# Patient Record
Sex: FEMALE | Race: WHITE | NOT HISPANIC OR LATINO | Employment: OTHER | URBAN - METROPOLITAN AREA
[De-identification: names, ages, dates, MRNs, and addresses within clinical notes are randomized per-mention and may not be internally consistent; named-entity substitution may affect disease eponyms.]

---

## 2024-03-16 ENCOUNTER — APPOINTMENT (OUTPATIENT)
Dept: CT IMAGING | Facility: CLINIC | Age: 74
End: 2024-03-16
Attending: PHYSICIAN ASSISTANT
Payer: COMMERCIAL

## 2024-03-16 ENCOUNTER — APPOINTMENT (OUTPATIENT)
Dept: RADIOLOGY | Facility: CLINIC | Age: 74
End: 2024-03-16
Attending: PHYSICIAN ASSISTANT
Payer: COMMERCIAL

## 2024-03-16 ENCOUNTER — HOSPITAL ENCOUNTER (EMERGENCY)
Facility: CLINIC | Age: 74
Discharge: HOME | End: 2024-03-16
Admitting: PHYSICIAN ASSISTANT
Payer: COMMERCIAL

## 2024-03-16 VITALS
DIASTOLIC BLOOD PRESSURE: 70 MMHG | TEMPERATURE: 98.9 F | OXYGEN SATURATION: 99 % | BODY MASS INDEX: 26.21 KG/M2 | SYSTOLIC BLOOD PRESSURE: 156 MMHG | HEIGHT: 59 IN | HEART RATE: 89 BPM | RESPIRATION RATE: 16 BRPM | WEIGHT: 130 LBS

## 2024-03-16 DIAGNOSIS — S01.01XA SCALP LACERATION, INITIAL ENCOUNTER: ICD-10-CM

## 2024-03-16 DIAGNOSIS — D69.6 THROMBOCYTOPENIA (H): ICD-10-CM
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
## 2024-03-16 DIAGNOSIS — M25.511 RIGHT SHOULDER PAIN: ICD-10-CM

## 2024-03-16 DIAGNOSIS — S09.90XA INJURY OF HEAD, INITIAL ENCOUNTER: ICD-10-CM

## 2024-03-16 LAB
ANION GAP SERPL CALCULATED.3IONS-SCNC: 9 MMOL/L (ref 7–15)
BUN SERPL-MCNC: 8.5 MG/DL (ref 8–23)
CALCIUM SERPL-MCNC: 8.8 MG/DL (ref 8.8–10.2)
CHLORIDE SERPL-SCNC: 107 MMOL/L (ref 98–107)
CREAT SERPL-MCNC: 0.51 MG/DL (ref 0.51–0.95)
DEPRECATED HCO3 PLAS-SCNC: 23 MMOL/L (ref 22–29)
EGFRCR SERPLBLD CKD-EPI 2021: >90 ML/MIN/1.73M2
ERYTHROCYTE [DISTWIDTH] IN BLOOD BY AUTOMATED COUNT: 15.2 % (ref 10–15)
GLUCOSE SERPL-MCNC: 97 MG/DL (ref 70–99)
HCT VFR BLD AUTO: 34.6 % (ref 35–47)
HGB BLD-MCNC: 10.7 G/DL (ref 11.7–15.7)
MCH RBC QN AUTO: 25.2 PG (ref 26.5–33)
MCHC RBC AUTO-ENTMCNC: 30.9 G/DL (ref 31.5–36.5)
MCV RBC AUTO: 81 FL (ref 78–100)
PLATELET # BLD AUTO: 111 10E3/UL (ref 150–450)
POTASSIUM SERPL-SCNC: 4.1 MMOL/L (ref 3.4–5.3)
RBC # BLD AUTO: 4.25 10E6/UL (ref 3.8–5.2)
SODIUM SERPL-SCNC: 139 MMOL/L (ref 135–145)
WBC # BLD AUTO: 6.2 10E3/UL (ref 4–11)

## 2024-03-16 PROCEDURE — 80048 BASIC METABOLIC PNL TOTAL CA: CPT | Performed by: PHYSICIAN ASSISTANT

## 2024-03-16 PROCEDURE — 85027 COMPLETE CBC AUTOMATED: CPT | Performed by: PHYSICIAN ASSISTANT

## 2024-03-16 PROCEDURE — 250N000009 HC RX 250: Performed by: PHYSICIAN ASSISTANT

## 2024-03-16 PROCEDURE — 99284 EMERGENCY DEPT VISIT MOD MDM: CPT | Mod: 25

## 2024-03-16 PROCEDURE — 70450 CT HEAD/BRAIN W/O DYE: CPT

## 2024-03-16 PROCEDURE — 73030 X-RAY EXAM OF SHOULDER: CPT | Mod: RT

## 2024-03-16 PROCEDURE — 271N000002 HC RX 271: Performed by: PHYSICIAN ASSISTANT

## 2024-03-16 PROCEDURE — 250N000013 HC RX MED GY IP 250 OP 250 PS 637: Performed by: PHYSICIAN ASSISTANT

## 2024-03-16 PROCEDURE — 12002 RPR S/N/AX/GEN/TRNK2.6-7.5CM: CPT

## 2024-03-16 PROCEDURE — 72125 CT NECK SPINE W/O DYE: CPT

## 2024-03-16 PROCEDURE — 36415 COLL VENOUS BLD VENIPUNCTURE: CPT | Performed by: PHYSICIAN ASSISTANT

## 2024-03-16 PROCEDURE — 250N000011 HC RX IP 250 OP 636: Performed by: PHYSICIAN ASSISTANT

## 2024-03-16 RX ORDER — ACETAMINOPHEN 325 MG/1
975 TABLET ORAL ONCE
Status: COMPLETED | OUTPATIENT
Start: 2024-03-16 | End: 2024-03-16

## 2024-03-16 RX ORDER — METHYLCELLULOSE 4000CPS 30 %
POWDER (GRAM) MISCELLANEOUS ONCE
Status: COMPLETED | OUTPATIENT
Start: 2024-03-16 | End: 2024-03-16

## 2024-03-16 RX ADMIN — EPINEPHRINE BITARTRATE 3 ML: 1 POWDER at 19:25

## 2024-03-16 RX ADMIN — ACETAMINOPHEN 975 MG: 325 TABLET ORAL at 16:51

## 2024-03-16 RX ADMIN — Medication: at 19:25

## 2024-03-16 ASSESSMENT — ACTIVITIES OF DAILY LIVING (ADL)
ADLS_ACUITY_SCORE: 35

## 2024-03-16 ASSESSMENT — COLUMBIA-SUICIDE SEVERITY RATING SCALE - C-SSRS
1. IN THE PAST MONTH, HAVE YOU WISHED YOU WERE DEAD OR WISHED YOU COULD GO TO SLEEP AND NOT WAKE UP?: NO
2. HAVE YOU ACTUALLY HAD ANY THOUGHTS OF KILLING YOURSELF IN THE PAST MONTH?: NO
6. HAVE YOU EVER DONE ANYTHING, STARTED TO DO ANYTHING, OR PREPARED TO DO ANYTHING TO END YOUR LIFE?: NO

## 2024-03-16 NOTE — ED TRIAGE NOTES
Arrives to ED with c/o mechanical fall that occurred at 0100 this morning in [place]. Hit L parietal on door handle. Unsure of LOC. Has dried blood noted. Hx of thrombocytopenia. Also c/o RUE pain.      Triage Assessment (Adult)       Row Name 03/16/24 1954          Triage Assessment    Airway WDL WDL        Respiratory WDL    Respiratory WDL WDL        Skin Circulation/Temperature WDL    Skin Circulation/Temperature WDL WDL        Cardiac WDL    Cardiac WDL WDL        Peripheral/Neurovascular WDL    Peripheral Neurovascular WDL WDL        Cognitive/Neuro/Behavioral WDL    Cognitive/Neuro/Behavioral WDL WDL

## 2024-03-16 NOTE — ED PROVIDER NOTES
Emergency Department Encounter   NAME: Evelia Lam ; AGE: 73 year old female ; YOB: 1950 ; MRN: [id number] ; PCP: No primary care provider on file.   ED PROVIDER: Rossy Puga PA-C    Evaluation Date & Time:   3/16/2024  4:11 PM    CHIEF COMPLAINT:  Fall and Head Injury      Impression and Plan   MDM:   Evelia Lam is a 73 year old female with a pertinent history of right humerus fracture, thrombocytopenia (taking Vitamin B), and anemia, who presents to the ED by walk-in for evaluation of fall and head injury.  The patient presents to the emergency department for evaluation of a mechanical slip and fall on the wet floor in the bathroom at 1 AM this morning at her daughter-in-law's house.  She sustained a laceration to her left parietal scalp, her daughter-in-law is a nurse practitioner, and examined the patient and helped her back to bed. She went home this morning, and showered and noticed the wound continued to bleed, prompting her visit to the ED.     Here in the ED, she is mildly hypertensive though vitally stable.  Generally well-appearing and nontoxic.  She does have an impressive 5 cm left parietal scalp laceration that does extend into the subcutaneous tissue.  No active bleeding here in the ED and she is not anticoagulated.  ED tech cleaned the wound, and removed scab/clotted blood.  Discussed wound repair with patient, let was applied for her discomfort, and 6 staples were placed with good wound approximation.  Surrounding sensation and circulation intact prior to and following wound closure.  We discussed monitoring for signs of infection, wound care, activities to avoid, follow-up in clinic for staple removal, and patient is comfortable with this plan.    She is alert, oriented and 3 with GCS of 15 has no focal neurologic deficits on exam.  Did obtain a head CT which showed no evidence of skull fracture or traumatic intracranial hemorrhage.  She denied neck pain, C-spine  nontender and she is neurovascularly intact.  CT without evidence of acute fracture or traumatic subluxation though she does have advanced spinal canal stenosis.  She has not had any chronic neck pain, weakness or numbness in her extremities, though advised further discussion with her PCP for further outpatient workup.  X-ray obtained of her shoulder which showed intact hardware, no evidence of acute fracture, subluxation or dislocation though mild AC joint arthritis noted.  At this time, we discussed plan for discharge.  Her daughter will stay with her tonight, and reviewed concerning signs and symptoms of delayed head bleed to monitor for at home and reasons to return to the emergency department.  Patient and daughter feel comfortable to plan and she was discharged home in stable condition.    Medical Decision Making    History:  Supplemental history from: Documented in chart  External Record(s) reviewed: Documented in chart    Work Up:  Chart documentation includes differential considered and any EKGs or imaging independently interpreted by provider, where specified.  In additional to work up documented, I considered the following work up: Documented in chart, if applicable.    External consultation:  Discussion of management with another provider: Documented in chart, if applicable    Complicating factors:  Care impacted by chronic illness: anemia, thrombocytopenia   Care affected by social determinants of health: access to care     Disposition considerations: Discharge. No recommendations on prescription strength medication(s). See documentation for any additional details.      ED COURSE:  4:24 PM I met and introduced myself to the patient. I gathered initial history and performed my physical exam. We discussed plan for initial workup.   7:12 PM Will perform laceration repair with plan to discharge patient after performing procedure.  7:18 PM Patient reports she wants numbing medications prior to lac  "repair.  7:56 PM Performed laceration repair. I rechecked the patient and discussed results, discharge, follow up, and reasons to return to the ED.    At the conclusion of the encounter I discussed the results of all the tests and the disposition. The questions were answered. The patient or family acknowledged understanding and was agreeable with the care plan.    FINAL IMPRESSION:    ICD-10-CM    1. Fall, initial encounter  W19.XXXA       2. Injury of head, initial encounter  S09.90XA       3. Scalp laceration, initial encounter  S01.01XA       4. Right shoulder pain  M25.511       5. Thrombocytopenia (H24)  D69.6             MEDICATIONS GIVEN IN THE EMERGENCY DEPARTMENT:  Medications   acetaminophen (TYLENOL) tablet 975 mg (975 mg Oral $Given 3/16/24 1651)   lidocaine/EPINEPHrine/tetracaine (LET) solution KIT (3 mLs Topical $Given 3/16/24 1925)   methylcellulose powder ( Topical $Given 3/16/24 1925)         NEW PRESCRIPTIONS STARTED AT TODAY'S ED VISIT:  New Prescriptions    No medications on file         HPI   Patient information was obtained from: Patient   Use of Intrepreter: N/A    Evelia Lam is a 73 year old female with a pertinent history of right humerus fracture, thrombocytopenia (taking Vitamin B), and anemia, who presents to the ED by walk-in for evaluation of fall and head injury.    Patient reports she slipped on the wet floor in the bathroom and possibly hit her left parietal aspect of head on the metal cabinet handle of the sink at 0100 today. Notes she is unsure if she LOC and reports she may have lost consciousness briefly. Patient's daughter-in-law heard patient fall and evaluated the head gash wound and wasn't concerned about it. Patient went back to sleep and reports gash kept bleeding and pain persists. Reports headache, right upper arm pain, and feeling \"shaky\", but denies neck pain, vomiting since fall, numbness or weakness of her lower or upper extremities, or vision changes.    Of " "note, patient received a blood transfusion 12 years ago because of thrombocytopenia. Notes history of right humerus fracture (5/2021) that required surgical intervention. No other reported complaints or concerns at this time.      REVIEW OF SYSTEMS:  Pertinent positive and negative symptoms per HPI.       Medical History     No past medical history on file.    No past surgical history on file.    No family history on file.         No current outpatient medications on file.        Physical Exam     First Vitals:  Patient Vitals for the past 24 hrs:   BP Temp Temp src Pulse Resp SpO2 Height Weight   03/16/24 1930 (!) 144/76 -- -- 93 -- 98 % -- --   03/16/24 1607 (!) 141/77 98.9  F (37.2  C) Temporal 82 16 97 % 1.499 m (4' 11\") 59 kg (130 lb)         PHYSICAL EXAM:   Physical Exam  Vitals and nursing note reviewed.   Constitutional:       General: She is not in acute distress.     Appearance: She is not toxic-appearing.   HENT:      Head: Normocephalic.      Comments: 5 cm laceration to the left parietal scalp that extends into subcutaneous tissue. No active bleeding. Small hematoma associated with the wound, though no scalp depression or bogginess.      Right Ear: Tympanic membrane normal.      Left Ear: Tympanic membrane normal.      Mouth/Throat:      Mouth: Mucous membranes are moist.      Pharynx: Oropharynx is clear.   Eyes:      Extraocular Movements: Extraocular movements intact.      Conjunctiva/sclera: Conjunctivae normal.      Pupils: Pupils are equal, round, and reactive to light.   Neck:      Comments: No midline cervical spinal tenderness or palpable bony step-offs.  No pain with axial loading.  Cardiovascular:      Rate and Rhythm: Normal rate and regular rhythm.      Heart sounds: Normal heart sounds.   Pulmonary:      Effort: Pulmonary effort is normal.      Breath sounds: Normal breath sounds.   Abdominal:      General: Abdomen is flat.      Palpations: Abdomen is soft.      Tenderness: There is no " abdominal tenderness.   Musculoskeletal:      Cervical back: Normal range of motion and neck supple.      Comments: Palpated entirety of spine without any reproducible midline tenderness or step-offs.  Pelvis is stable.  Moving all extremities though she does have some mild tenderness to her anterior and superior right shoulder though range of motion intact.  2+ radial pulses.  Sensory exam to the right arm intact.   Neurological:      Mental Status: She is alert and oriented to person, place, and time. Mental status is at baseline.      GCS: GCS eye subscore is 4. GCS verbal subscore is 5. GCS motor subscore is 6.      Comments: Cranial nerves III through XII intact.  Answering questions appropriately with normal speech.  Following commands.  Sensation to light touch intact to face and all extremities.  Negative pronator drift.  5 out of 5 strength with , flexion extension at elbow joint, flexion at shoulder and hip joint, dorsiflexion and plantarflexion against resistance.             Results     LAB:  All pertinent labs reviewed and interpreted  Labs Ordered and Resulted from Time of ED Arrival to Time of ED Departure   CBC WITH PLATELETS - Abnormal       Result Value    WBC Count 6.2      RBC Count 4.25      Hemoglobin 10.7 (*)     Hematocrit 34.6 (*)     MCV 81      MCH 25.2 (*)     MCHC 30.9 (*)     RDW 15.2 (*)     Platelet Count 111 (*)    BASIC METABOLIC PANEL - Normal    Sodium 139      Potassium 4.1      Chloride 107      Carbon Dioxide (CO2) 23      Anion Gap 9      Urea Nitrogen 8.5      Creatinine 0.51      GFR Estimate >90      Calcium 8.8      Glucose 97         RADIOLOGY:  XR Shoulder Right G/E 3 Views   Final Result   IMPRESSION: Interval internal fixation of the proximal humerus with a lateral plate and multiple screws. Hardware is well seated. Prior proximal humeral fractures are well-healed. Glenohumeral and acromioclavicular joint alignment is normal. Mild    degenerative arthrosis at the  AC joint. Right shoulder negative for acute fracture.       CT Cervical Spine w/o Contrast   Final Result   IMPRESSION:   HEAD CT:   1.  No evidence of acute traumatic intracranial abnormality.   2.  Left frontal/parietal scalp abrasion suspected. No subjacent calvarial fracture. No embedded radiopaque foreign bodies.      CERVICAL SPINE CT:   1.  No CT evidence for acute fracture or traumatic malalignment involving the cervical spine.   2.  Multilevel cervical spondylosis, further detailed above. Suspected advanced spinal canal stenosis and bilateral neural foraminal stenosis at C6-C7.      Head CT w/o contrast   Final Result   IMPRESSION:   HEAD CT:   1.  No evidence of acute traumatic intracranial abnormality.   2.  Left frontal/parietal scalp abrasion suspected. No subjacent calvarial fracture. No embedded radiopaque foreign bodies.      CERVICAL SPINE CT:   1.  No CT evidence for acute fracture or traumatic malalignment involving the cervical spine.   2.  Multilevel cervical spondylosis, further detailed above. Suspected advanced spinal canal stenosis and bilateral neural foraminal stenosis at C6-C7.            ECG:  N/A      PROCEDURES:  PROCEDURE: Laceration Repair   INDICATIONS: Laceration   PROCEDURE PROVIDER: Lorraine Puga PA-C   SITE: Left parietal head   TYPE/SIZE: complex, subcutaneous, clean, and no foreign body visualized  5 cm (total length)   FUNCTIONAL ASSESSMENT: Surrounding sensation and circulation intact.    MEDICATION: Topical LET   PREPARATION: Please see RN's note   DEBRIDEMENT: no debridement and wound explored, no foreign body found   CLOSURE:  Superficial and Intermediate layer closed with 6 staples.     Total number of sutures/staples placed: 6 haider       IJossy, am serving as a scribe to document services personally performed by Rossy Puga PA-C, based on my observation and the provider's statements to me. I, Rossy Puga PA-C attest that Jossy Rudolph is acting in  a scribe capacity, has observed my performance of the services and has documented them in accordance with my direction.       Rossy Puga PA-C   Emergency Medicine   [facility] EMERGENCY ROOM       Rossy Puga PA-C  03/16/24 [id number]

## 2024-03-17 NOTE — DISCHARGE INSTRUCTIONS
As we discussed, you have 6 staples in your scalp wound which should be removed by healthcare professional in 10 days.  Please monitor the wound for signs of infection including increased pain, redness, swelling, or puslike discharge and return to the ER if this develops.    If at any point you develop severe headache, vomiting, confusion, difficulties speaking or walking, vision changes, arm or leg weakness or numbness please return to the ER for further evaluation.    The imaging of your neck incidentally showed stenosis of your cervical spine or narrowing.  Please discuss this with your primary care provider as they may want to obtain him an MRI if you develop any associated symptoms.